# Patient Record
Sex: MALE | Race: WHITE
[De-identification: names, ages, dates, MRNs, and addresses within clinical notes are randomized per-mention and may not be internally consistent; named-entity substitution may affect disease eponyms.]

---

## 2018-01-12 ENCOUNTER — HOSPITAL ENCOUNTER (EMERGENCY)
Dept: HOSPITAL 17 - NEPC | Age: 83
Discharge: HOME | End: 2018-01-12
Payer: COMMERCIAL

## 2018-01-12 VITALS
TEMPERATURE: 98.4 F | SYSTOLIC BLOOD PRESSURE: 116 MMHG | OXYGEN SATURATION: 91 % | RESPIRATION RATE: 12 BRPM | HEART RATE: 85 BPM | DIASTOLIC BLOOD PRESSURE: 66 MMHG

## 2018-01-12 VITALS — OXYGEN SATURATION: 94 % | DIASTOLIC BLOOD PRESSURE: 92 MMHG | SYSTOLIC BLOOD PRESSURE: 134 MMHG

## 2018-01-12 VITALS — BODY MASS INDEX: 36.93 KG/M2 | HEIGHT: 70 IN | WEIGHT: 257.94 LBS

## 2018-01-12 VITALS — OXYGEN SATURATION: 97 %

## 2018-01-12 VITALS — DIASTOLIC BLOOD PRESSURE: 81 MMHG | SYSTOLIC BLOOD PRESSURE: 175 MMHG

## 2018-01-12 VITALS — OXYGEN SATURATION: 96 %

## 2018-01-12 DIAGNOSIS — I48.91: Primary | ICD-10-CM

## 2018-01-12 DIAGNOSIS — R06.02: ICD-10-CM

## 2018-01-12 LAB
BASOPHILS # BLD AUTO: 0 TH/MM3 (ref 0–0.2)
BASOPHILS NFR BLD: 0.5 % (ref 0–2)
BUN SERPL-MCNC: 19 MG/DL (ref 7–18)
CALCIUM SERPL-MCNC: 8.2 MG/DL (ref 8.5–10.1)
CHLORIDE SERPL-SCNC: 108 MEQ/L (ref 98–107)
CREAT SERPL-MCNC: 1.13 MG/DL (ref 0.6–1.3)
EOSINOPHIL # BLD: 0.1 TH/MM3 (ref 0–0.4)
EOSINOPHIL NFR BLD: 1.6 % (ref 0–4)
ERYTHROCYTE [DISTWIDTH] IN BLOOD BY AUTOMATED COUNT: 13.6 % (ref 11.6–17.2)
GFR SERPLBLD BASED ON 1.73 SQ M-ARVRAT: 62 ML/MIN (ref 89–?)
GLUCOSE SERPL-MCNC: 117 MG/DL (ref 74–106)
HCO3 BLD-SCNC: 26.1 MEQ/L (ref 21–32)
HCT VFR BLD CALC: 43.2 % (ref 39–51)
HGB BLD-MCNC: 14.8 GM/DL (ref 13–17)
LYMPHOCYTES # BLD AUTO: 1.8 TH/MM3 (ref 1–4.8)
LYMPHOCYTES NFR BLD AUTO: 27 % (ref 9–44)
MAGNESIUM SERPL-MCNC: 2.4 MG/DL (ref 1.5–2.5)
MCH RBC QN AUTO: 31 PG (ref 27–34)
MCHC RBC AUTO-ENTMCNC: 34.3 % (ref 32–36)
MCV RBC AUTO: 90.5 FL (ref 80–100)
MONOCYTE #: 0.5 TH/MM3 (ref 0–0.9)
MONOCYTES NFR BLD: 8.3 % (ref 0–8)
NEUTROPHILS # BLD AUTO: 4.1 TH/MM3 (ref 1.8–7.7)
NEUTROPHILS NFR BLD AUTO: 62.6 % (ref 16–70)
PLATELET # BLD: 126 TH/MM3 (ref 150–450)
PMV BLD AUTO: 9.4 FL (ref 7–11)
RBC # BLD AUTO: 4.78 MIL/MM3 (ref 4.5–5.9)
SODIUM SERPL-SCNC: 141 MEQ/L (ref 136–145)
TROPONIN I SERPL-MCNC: 0.03 NG/ML (ref 0.02–0.05)
WBC # BLD AUTO: 6.6 TH/MM3 (ref 4–11)

## 2018-01-12 PROCEDURE — 85025 COMPLETE CBC W/AUTO DIFF WBC: CPT

## 2018-01-12 PROCEDURE — 80048 BASIC METABOLIC PNL TOTAL CA: CPT

## 2018-01-12 PROCEDURE — 83880 ASSAY OF NATRIURETIC PEPTIDE: CPT

## 2018-01-12 PROCEDURE — 84484 ASSAY OF TROPONIN QUANT: CPT

## 2018-01-12 PROCEDURE — 71045 X-RAY EXAM CHEST 1 VIEW: CPT

## 2018-01-12 PROCEDURE — 82550 ASSAY OF CK (CPK): CPT

## 2018-01-12 PROCEDURE — 93005 ELECTROCARDIOGRAM TRACING: CPT

## 2018-01-12 PROCEDURE — 99285 EMERGENCY DEPT VISIT HI MDM: CPT

## 2018-01-12 PROCEDURE — 83735 ASSAY OF MAGNESIUM: CPT

## 2018-01-12 NOTE — PD
HPI


Chief Complaint:  Medical Clearance


Time Seen by Provider:  11:35


Travel History


International Travel<30 days:  No


Contact w/Intl Traveler<30days:  No


Traveled to known affect area:  No





History of Present Illness


HPI


85yo M presented to the ED by referral from Dr. Vela. Two days prior Dr. Vela increased his Atenolol dosage to 50mg BID and returned to clinic 

today for a checkup to find an elevated HR about 136. Pt has a significant 

medical history of AFib, HTN and high cholesterol. Pt also states that he has 

had a URI for about 4-5 days. Pt denies any lightheadness, chest pain, SOB, 

palpitations. Pt does report exertional dyspnea. 





Modifying Factors: None


Associated Signs & Symptoms: A. fib with RVR, sent in by Dr. Vela


Risk Factors: History of A. fib





PFSH


Social History


Alcohol Use:  No


Tobacco Use:  No


Substance Use:  No





Allergies-Medications


(Allergen,Severity, Reaction):  


Coded Allergies:  


     penicillin G (Verified  Allergy, Severe, RASH , 1/12/18)





Review of Systems


Except as stated in HPI:  all other systems reviewed are Neg


Respiratory:  Positive: Cough





Physical Exam


Narrative


GENERAL: 85yo W/M well-developed and well nourished elderly white male, in no 

apparent distress. Alert and oriented x3. 


SKIN: Warm and dry.


HEAD: Atraumatic. Normocephalic. 


NECK: Trachea midline. No JVD.  Supple.


CARDIOVASCULAR: Irregularly irregular and tachycardic. 


RESPIRATORY: No accessory muscle use. Clear to auscultation. Breath sounds 

equal bilaterally. Mild upper respiratory congestion. 


GASTROINTESTINAL: Abdomen soft, non-tender, nondistended. Hepatic and splenic 

margins not palpable. 


MUSCULOSKELETAL: Extremities without clubbing, and cyanosis. No obvious 

deformities. Bilateral pedal edema. 


NEUROLOGICAL: Awake and alert. No obvious cranial nerve deficits.  Motor 

grossly within normal limits. Normal speech.


PSYCHIATRIC: Appropriate mood and affect; insight and judgment normal.





Data


Data


Last Documented VS





Vital Signs








  Date Time  Temp Pulse Resp B/P (MAP) Pulse Ox O2 Delivery O2 Flow Rate FiO2


 


1/12/18 12:58  75 17 134/92 (106) 94 Room Air  


 


1/12/18 10:37 98.4       








Orders





 Orders


Electrocardiogram (1/12/18 11:26)


Basic Metabolic Panel (Bmp) (1/12/18 11:26)


Ckmb (Isoenzyme) Profile (1/12/18 11:26)


Complete Blood Count With Diff (1/12/18 11:26)


Magnesium (Mg) (1/12/18 11:26)


Troponin I (1/12/18 11:26)


Chest, Single Ap (1/12/18 11:26)


Ecg Monitoring (1/12/18 11:26)


Bilateral Bp Monitoring (1/12/18 11:26)


Iv Access Insert/Monitor (1/12/18 11:26)


Oximetry (1/12/18 11:26)


Oxygen Administration (1/12/18 11:26)


Sodium Chloride 0.9% Flush (Ns Flush) (1/12/18 11:30)


B-Type Natriuretic Peptide (1/12/18 11:35)





Labs





Laboratory Tests








Test


  1/12/18


11:45


 


White Blood Count 6.6 TH/MM3 


 


Red Blood Count 4.78 MIL/MM3 


 


Hemoglobin 14.8 GM/DL 


 


Hematocrit 43.2 % 


 


Mean Corpuscular Volume 90.5 FL 


 


Mean Corpuscular Hemoglobin 31.0 PG 


 


Mean Corpuscular Hemoglobin


Concent 34.3 % 


 


 


Red Cell Distribution Width 13.6 % 


 


Platelet Count 126 TH/MM3 


 


Mean Platelet Volume 9.4 FL 


 


Neutrophils (%) (Auto) 62.6 % 


 


Lymphocytes (%) (Auto) 27.0 % 


 


Monocytes (%) (Auto) 8.3 % 


 


Eosinophils (%) (Auto) 1.6 % 


 


Basophils (%) (Auto) 0.5 % 


 


Neutrophils # (Auto) 4.1 TH/MM3 


 


Lymphocytes # (Auto) 1.8 TH/MM3 


 


Monocytes # (Auto) 0.5 TH/MM3 


 


Eosinophils # (Auto) 0.1 TH/MM3 


 


Basophils # (Auto) 0.0 TH/MM3 


 


CBC Comment DIFF FINAL 


 


Differential Comment  


 


Blood Urea Nitrogen 19 MG/DL 


 


Creatinine 1.13 MG/DL 


 


Random Glucose 117 MG/DL 


 


Calcium Level 8.2 MG/DL 


 


Magnesium Level 2.4 MG/DL 


 


Sodium Level 141 MEQ/L 


 


Potassium Level 4.2 MEQ/L 


 


Chloride Level 108 MEQ/L 


 


Carbon Dioxide Level 26.1 MEQ/L 


 


Anion Gap 7 MEQ/L 


 


Estimat Glomerular Filtration


Rate 62 ML/MIN 


 


 


Total Creatine Kinase 74 U/L 


 


Troponin I 0.03 NG/ML 


 


B-Type Natriuretic Peptide 151 PG/ML 











MDM


Medical Decision Making


Medical Screen Exam Complete:  Yes


Emergency Medical Condition:  Yes


Medical Record Reviewed:  Yes


Interpretation(s)


EKG shows A. fib with a rate of 88 bpm.  No signs of acute ST-T changes.








Laboratory Tests








Test


  1/12/18


11:45


 


Platelet Count


  126 TH/MM3


(150-450)


 


Monocytes (%) (Auto)


  8.3 %


(0.0-8.0)


 


Blood Urea Nitrogen


  19 MG/DL


(7-18)


 


Random Glucose


  117 MG/DL


()


 


Calcium Level


  8.2 MG/DL


(8.5-10.1)


 


Chloride Level


  108 MEQ/L


()


 


Estimat Glomerular Filtration


Rate 62 ML/MIN


(>89)


 


B-Type Natriuretic Peptide


  151 PG/ML


(0-100)








Last 24 hours Impressions








Chest X-Ray 1/12/18 1126 Signed





Impressions: 





 Service Date/Time:  Friday, January 12, 2018 12:04 - CONCLUSION:  1. Minimal 





 basilar atelectasis. Tortuous aorta.     Joey Templeton MD 








Differential Diagnosis


Dysrhythmias versus dehydration versus metabolic issues


Narrative Course


EKG shows A. fib with a rate of 88 bpm in the ER.  Lab work was otherwise 

unremarkable.  Patient is completely asymptomatic.  He states that sometimes he 

notices that he has a little dyspnea on exertion.  Case was discussed with Dr. Szymanski who is covering for Dr. Vela and he states that he would like the 

patient to receive Cardizem 30 twice a day in addition to his current regimen 

and have him follow-up with cardiology.  Return for any worsening in symptoms 

as needed.  The plan has been discussed with the patient and he states 

understanding.





Diagnosis





 Primary Impression:  


 Atrial fibrillation with RVR





***Additional Instructions:  


Follow-up with Dr. Vela.  Take medications as prescribed.  Return for any 

worsening in symptoms as needed.


***Med/Other Pt SpecificInfo:  Prescription(s) given


Scripts


Diltiazem (Cardizem) 30 Mg Tab


30 MG PO BID for Angina, #20 TAB 0 Refills


   Prov: Andrew Avila MD         1/12/18


Disposition:  01 DISCHARGE HOME


Condition:  Stable











Andrew Avila MD Jan 12, 2018 11:42

## 2018-01-12 NOTE — RADRPT
EXAM DATE/TIME:  01/12/2018 12:04 

 

[facility] COMPARISON:     

No previous studies available for comparison.

 

                     

INDICATIONS :     

Palpitations, short of breath

                     

 

MEDICAL HISTORY :     

None.          

 

SURGICAL HISTORY :     

None.   

 

ENCOUNTER:     

Initial                                        

 

ACUITY:     

1 day      

 

PAIN SCORE:     

2/10

 

LOCATION:     

Bilateral chest 

 

FINDINGS:     

A single view of the chest demonstrates minimal basilar atelectasis. No effusion. No pneumothorax. Ca
rdiomegaly. Tortuous aorta.

 

CONCLUSION:     

1. Minimal basilar atelectasis. Tortuous aorta.

 

 

 

 Joey Templeton MD on January 12, 2018 at 12:20           

Board Certified Radiologist.

 This report was verified electronically.

## 2018-01-13 NOTE — EKG
Date Performed: 01/12/2018       Time Performed: 11:42:10

 

PTAGE:      86 years

 

EKG:      ATRIAL FIBRILLATION BORDERLINE LEFT AXIS DEVIATION MINIMAL VOLTAGE CRITERIA FOR LVH, CONSID
ER NORMAL VARIANT NONSPECIFIC T-WAVE ABNORMALITY ABNORMAL RHYTHM ECG 

 

NO PREVIOUS TRACING            

 

DOCTOR:   Lorenzo Szymanski  Interpretating Date/Time  01/13/2018 15:27:31